# Patient Record
Sex: MALE | Race: WHITE | NOT HISPANIC OR LATINO | Employment: UNEMPLOYED | URBAN - METROPOLITAN AREA
[De-identification: names, ages, dates, MRNs, and addresses within clinical notes are randomized per-mention and may not be internally consistent; named-entity substitution may affect disease eponyms.]

---

## 2018-05-01 ENCOUNTER — HOSPITAL ENCOUNTER (EMERGENCY)
Facility: HOSPITAL | Age: 53
Discharge: HOME | End: 2018-05-01
Attending: EMERGENCY MEDICINE
Payer: MEDICAID

## 2018-05-01 VITALS
WEIGHT: 205 LBS | DIASTOLIC BLOOD PRESSURE: 68 MMHG | RESPIRATION RATE: 16 BRPM | HEART RATE: 76 BPM | BODY MASS INDEX: 28.7 KG/M2 | HEIGHT: 71 IN | OXYGEN SATURATION: 97 % | SYSTOLIC BLOOD PRESSURE: 120 MMHG | TEMPERATURE: 98 F

## 2018-05-01 DIAGNOSIS — M79.89 RIGHT LEG SWELLING: ICD-10-CM

## 2018-05-01 LAB
ALBUMIN SERPL-MCNC: 3.8 G/DL (ref 3.3–5.5)
ALP SERPL-CCNC: 67 U/L (ref 42–141)
BILIRUB SERPL-MCNC: 0.4 MG/DL (ref 0.2–1.6)
BILIRUBIN, POC UA: NEGATIVE
BLOOD, POC UA: ABNORMAL
BUN SERPL-MCNC: 14 MG/DL (ref 7–22)
CALCIUM SERPL-MCNC: 9.7 MG/DL (ref 8–10.3)
CHLORIDE SERPL-SCNC: 106 MMOL/L (ref 98–108)
CLARITY, POC UA: CLEAR
COLOR, POC UA: YELLOW
CREAT SERPL-MCNC: 1 MG/DL (ref 0.6–1.2)
GLUCOSE SERPL-MCNC: 155 MG/DL (ref 73–118)
GLUCOSE, POC UA: NEGATIVE
KETONES, POC UA: NEGATIVE
LEUKOCYTE EST, POC UA: NEGATIVE
NITRITE, POC UA: NEGATIVE
PH UR STRIP: 6 [PH]
POC ALT (SGPT): 31 U/L (ref 10–47)
POC AST (SGOT): 31 U/L (ref 11–38)
POC B-TYPE NATRIURETIC PEPTIDE: <5 PG/ML (ref 0–100)
POC TCO2: 27 MMOL/L (ref 18–33)
POTASSIUM BLD-SCNC: 3.9 MMOL/L (ref 3.6–5.1)
PROTEIN, POC UA: NEGATIVE
PROTEIN, POC: 6.8 G/DL (ref 6.4–8.1)
SODIUM BLD-SCNC: 147 MMOL/L (ref 128–145)
SPECIFIC GRAVITY, POC UA: 1.02
UROBILINOGEN, POC UA: 0.2 E.U./DL

## 2018-05-01 PROCEDURE — 80053 COMPREHEN METABOLIC PANEL: CPT

## 2018-05-01 PROCEDURE — 83880 ASSAY OF NATRIURETIC PEPTIDE: CPT

## 2018-05-01 PROCEDURE — 99284 EMERGENCY DEPT VISIT MOD MDM: CPT

## 2018-05-01 PROCEDURE — 81003 URINALYSIS AUTO W/O SCOPE: CPT

## 2018-05-01 PROCEDURE — 85025 COMPLETE CBC W/AUTO DIFF WBC: CPT

## 2018-05-01 RX ORDER — MUPIROCIN 20 MG/G
OINTMENT TOPICAL 3 TIMES DAILY
Qty: 22 G | Refills: 0 | Status: SHIPPED | OUTPATIENT
Start: 2018-05-01 | End: 2018-05-11

## 2018-05-01 RX ORDER — SULFAMETHOXAZOLE AND TRIMETHOPRIM 800; 160 MG/1; MG/1
1 TABLET ORAL 2 TIMES DAILY
Qty: 14 TABLET | Refills: 0 | Status: SHIPPED | OUTPATIENT
Start: 2018-05-01 | End: 2018-05-08
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
## 2019-07-01 ENCOUNTER — HOSPITAL ENCOUNTER (EMERGENCY)
Facility: HOSPITAL | Age: 54
Discharge: HOME | End: 2019-07-01
Attending: EMERGENCY MEDICINE
Payer: MEDICAID

## 2019-07-01 VITALS
WEIGHT: 230 LBS | OXYGEN SATURATION: 95 % | HEART RATE: 88 BPM | RESPIRATION RATE: 19 BRPM | BODY MASS INDEX: 32.2 KG/M2 | SYSTOLIC BLOOD PRESSURE: 167 MMHG | HEIGHT: 71 IN | TEMPERATURE: 98 F | DIASTOLIC BLOOD PRESSURE: 72 MMHG

## 2019-07-01 DIAGNOSIS — L03.90 CELLULITIS, UNSPECIFIED CELLULITIS SITE: Primary | ICD-10-CM

## 2019-07-01 LAB — POCT GLUCOSE: 144 MG/DL (ref 70–110)

## 2019-07-01 PROCEDURE — 99284 EMERGENCY DEPT VISIT MOD MDM: CPT | Mod: 25

## 2019-07-01 PROCEDURE — 96372 THER/PROPH/DIAG INJ SC/IM: CPT

## 2019-07-01 PROCEDURE — 63600175 PHARM REV CODE 636 W HCPCS: Performed by: EMERGENCY MEDICINE

## 2019-07-01 PROCEDURE — 82962 GLUCOSE BLOOD TEST: CPT

## 2019-07-01 PROCEDURE — 25000003 PHARM REV CODE 250: Performed by: EMERGENCY MEDICINE

## 2019-07-01 RX ORDER — KETOROLAC TROMETHAMINE 30 MG/ML
15 INJECTION, SOLUTION INTRAMUSCULAR; INTRAVENOUS
Status: COMPLETED | OUTPATIENT
Start: 2019-07-01 | End: 2019-07-01

## 2019-07-01 RX ORDER — CEPHALEXIN 500 MG/1
500 CAPSULE ORAL 4 TIMES DAILY
Qty: 20 CAPSULE | Refills: 0 | Status: SHIPPED | OUTPATIENT
Start: 2019-07-01 | End: 2019-07-06

## 2019-07-01 RX ORDER — CEPHALEXIN 250 MG/1
500 CAPSULE ORAL
Status: COMPLETED | OUTPATIENT
Start: 2019-07-01 | End: 2019-07-01

## 2019-07-01 RX ADMIN — CEPHALEXIN 500 MG: 250 CAPSULE ORAL at 09:07

## 2019-07-01 RX ADMIN — KETOROLAC TROMETHAMINE 15 MG: 30 INJECTION, SOLUTION INTRAMUSCULAR; INTRAVENOUS at 09:07

## 2019-07-02 NOTE — DISCHARGE INSTRUCTIONS
MOTRIN TYLENOL AS NEEDED FOR PAIN. ANTIBIOTICS AS DIRECTED.  PLEASE RETURN FOR ANY WORSENING OR CONCERNS.  PLEASE FOLLOW UP WITH PRIMARY CARE.

## 2019-07-02 NOTE — ED PROVIDER NOTES
"Encounter Date: 7/1/2019    SCRIBE #1 NOTE: I, Ebony HALEGumaro, am scribing for, and in the presence of,  Liliana Carty MD . I have scribed the following portions of the note - Other sections scribed: HPI, ROS, PE.       History     Chief Complaint   Patient presents with    Insect Bite     pt reports that he was sleeping in shed & was woken by a painful "bite" to his RIGHT medial ankle area/lower calf area 3 days ago; pt reports seeing a few spiders in the shed and thinks he might have been bitten by one; pt has redness, throbbing pain, & swelling that has worsened over the past 3 days; pt denies taking any medications for pain/swelling;    Leg Pain     CC: Insect Bite    53 year old male  has a past medical history of pre-diabetes, Back pain, Diverticulosis, Hypertension, and Prostate atrophy  presents to the ED for evaluation of erythema  that occurred 3nights ago to lower posterior calf after a possible insect bite to the area. Pt reports he slept in the his studio apartment (not a shed) 3 days ago. Pt is self-employed in construction and denies that he had a splinter at work. Pt hast not taken any home medications for pain. He did not inject anything to the area.   No ivdu  No fvr  No other co  Pt was recently on Clindamycin for a URI. Pt is allergic to iodine. No other symptoms.       The history is provided by the patient. No  was used.     Review of patient's allergies indicates:   Allergen Reactions    Iodine and iodide containing products Hives     Past Medical History:   Diagnosis Date    Back pain     Diverticulosis     Hypertension     Prostate atrophy      Past Surgical History:   Procedure Laterality Date    APPENDECTOMY      COLONOSCOPY N/A 2/5/2015    Performed by Mina Doshi MD at [facility]     Family History   Problem Relation Age of Onset    Diabetes Mother     Diabetes Father     Arthritis Father      Social History     Tobacco Use    Smoking status: " Current Some Day Smoker   Substance Use Topics    Alcohol use: No    Drug use: No     Review of Systems   Constitutional: Negative for appetite change and fever.   HENT: Negative for rhinorrhea and sore throat.    Eyes: Negative for visual disturbance.   Respiratory: Negative for cough and shortness of breath.    Cardiovascular: Negative for chest pain.   Gastrointestinal: Negative for abdominal pain.   Genitourinary: Negative for dysuria.   Musculoskeletal: Negative for gait problem.   Skin: Negative for rash.        Area of erythema and slight pain posterior left calf   Neurological: Negative for syncope.   All other systems reviewed and are negative.      Physical Exam     Initial Vitals [07/01/19 2111]   BP Pulse Resp Temp SpO2   139/75 96 19 99 °F (37.2 °C) 97 %      MAP       --         Physical Exam    Constitutional: He appears well-developed and well-nourished. He is not diaphoretic. No distress.   HENT:   Head: Normocephalic and atraumatic.   Eyes: Conjunctivae and EOM are normal. Pupils are equal, round, and reactive to light. No scleral icterus.   Neck: Normal range of motion. Neck supple.   Cardiovascular: Intact distal pulses.   Pulmonary/Chest: No stridor. No respiratory distress.   Musculoskeletal: Normal range of motion. He exhibits no edema or tenderness.   Neurological: He is alert and oriented to person, place, and time. He has normal strength. No cranial nerve deficit.   Skin: Skin is warm and dry. No rash noted.   None by 13 cm area of redness posterior lower calf not crossing over joint.  I have demarcated the area with a marker.  Not streaking.  No fluctuance.  No wound or drainable collection.  No evidence of spider or insect bite.   Psychiatric: He has a normal mood and affect. His behavior is normal.         ED Course   Procedures  Labs Reviewed - No data to display       Imaging Results    None                       Attending Attestation:             Attending ED Notes:   Patient with  slight area of cellulitis.  He has no MRSA risk factors.  Denies IV drug use.  Sugars 140.  He ate [employer] on the way here  No constitutional symptoms. No other complaints at this time.  He will watch the area.  He will look for streaking fever etc.  They understand when to return and when to follow-up as an outpatient.    No evidence of blistering or necrotizing fasciitis.  Denies IV drug use            is  Clinical Impression:  Cellulitis left lower extremity without abscess       ICD-10-CM ICD-9-CM   1. Cellulitis, unspecified cellulitis site L03.90 682.9                                Liliana Carty MD  07/01/19 2149       Liliana Carty MD  07/01/19 2150

## 2019-07-02 NOTE — ED TRIAGE NOTES
Pt arrived to the ED due localized right left pain, swelling, and redness that started a few days ago. Pt reports that he may have been bitten by an insect. Pt reports pain is 10/10

## 2020-01-04 ENCOUNTER — HOSPITAL ENCOUNTER (EMERGENCY)
Facility: HOSPITAL | Age: 55
Discharge: HOME | End: 2020-01-04
Attending: EMERGENCY MEDICINE
Payer: MEDICAID

## 2020-01-04 VITALS
TEMPERATURE: 98 F | SYSTOLIC BLOOD PRESSURE: 120 MMHG | OXYGEN SATURATION: 96 % | RESPIRATION RATE: 16 BRPM | HEART RATE: 87 BPM | HEIGHT: 70 IN | DIASTOLIC BLOOD PRESSURE: 80 MMHG | WEIGHT: 207 LBS | BODY MASS INDEX: 29.63 KG/M2

## 2020-01-04 DIAGNOSIS — S16.1XXA CERVICAL STRAIN, ACUTE, INITIAL ENCOUNTER: ICD-10-CM
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
## 2020-01-04 PROCEDURE — 63600175 PHARM REV CODE 636 W HCPCS: Mod: ER | Performed by: NURSE PRACTITIONER

## 2020-01-04 PROCEDURE — 96372 THER/PROPH/DIAG INJ SC/IM: CPT | Mod: ER

## 2020-01-04 PROCEDURE — 99284 EMERGENCY DEPT VISIT MOD MDM: CPT | Mod: 25,ER

## 2020-01-04 RX ORDER — DICLOFENAC SODIUM 50 MG/1
50 TABLET, DELAYED RELEASE ORAL EVERY 8 HOURS PRN
Qty: 20 TABLET | Refills: 0 | OUTPATIENT
Start: 2020-01-04 | End: 2022-09-02

## 2020-01-04 RX ORDER — KETOROLAC TROMETHAMINE 30 MG/ML
30 INJECTION, SOLUTION INTRAMUSCULAR; INTRAVENOUS
Status: COMPLETED | OUTPATIENT
Start: 2020-01-04 | End: 2020-01-04

## 2020-01-04 RX ORDER — METHOCARBAMOL 750 MG/1
1500 TABLET, FILM COATED ORAL EVERY 8 HOURS PRN
Qty: 30 TABLET | Refills: 0 | OUTPATIENT
Start: 2020-01-04 | End: 2022-09-02

## 2020-01-04 RX ADMIN — KETOROLAC TROMETHAMINE 30 MG: 30 INJECTION, SOLUTION INTRAMUSCULAR at 06:01
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
## 2021-03-23 ENCOUNTER — HOSPITAL ENCOUNTER (EMERGENCY)
Facility: HOSPITAL | Age: 56
Discharge: HOME | End: 2021-03-23
Attending: EMERGENCY MEDICINE
Payer: COMMERCIAL

## 2021-03-23 VITALS
HEIGHT: 70 IN | BODY MASS INDEX: 32.01 KG/M2 | SYSTOLIC BLOOD PRESSURE: 173 MMHG | RESPIRATION RATE: 17 BRPM | OXYGEN SATURATION: 96 % | TEMPERATURE: 98 F | WEIGHT: 223.56 LBS | HEART RATE: 63 BPM | DIASTOLIC BLOOD PRESSURE: 85 MMHG

## 2021-03-23 DIAGNOSIS — Z87.19 HISTORY OF DIVERTICULOSIS: ICD-10-CM

## 2021-03-23 DIAGNOSIS — K62.5 RECTAL BLEEDING: Primary | ICD-10-CM

## 2021-03-23 LAB
ALBUMIN SERPL BCP-MCNC: 4.2 G/DL (ref 3.5–5.2)
ALP SERPL-CCNC: 73 U/L (ref 55–135)
ALT SERPL W/O P-5'-P-CCNC: 45 U/L (ref 10–44)
ANION GAP SERPL CALC-SCNC: 12 MMOL/L (ref 8–16)
AST SERPL-CCNC: 26 U/L (ref 10–40)
BASOPHILS # BLD AUTO: 0.07 K/UL (ref 0–0.2)
BASOPHILS NFR BLD: 0.6 % (ref 0–1.9)
BILIRUB SERPL-MCNC: 0.3 MG/DL (ref 0.1–1)
BUN SERPL-MCNC: 15 MG/DL (ref 6–20)
CALCIUM SERPL-MCNC: 9.8 MG/DL (ref 8.7–10.5)
CHLORIDE SERPL-SCNC: 103 MMOL/L (ref 95–110)
CO2 SERPL-SCNC: 25 MMOL/L (ref 23–29)
CREAT SERPL-MCNC: 0.9 MG/DL (ref 0.5–1.4)
DIFFERENTIAL METHOD: ABNORMAL
EOSINOPHIL # BLD AUTO: 0.3 K/UL (ref 0–0.5)
EOSINOPHIL NFR BLD: 2.2 % (ref 0–8)
ERYTHROCYTE [DISTWIDTH] IN BLOOD BY AUTOMATED COUNT: 13.8 % (ref 11.5–14.5)
EST. GFR  (AFRICAN AMERICAN): >60 ML/MIN/1.73 M^2
EST. GFR  (NON AFRICAN AMERICAN): >60 ML/MIN/1.73 M^2
GLUCOSE SERPL-MCNC: 104 MG/DL (ref 70–110)
HCT VFR BLD AUTO: 47.9 % (ref 40–54)
HGB BLD-MCNC: 16.1 G/DL (ref 14–18)
IMM GRANULOCYTES # BLD AUTO: 0.05 K/UL (ref 0–0.04)
IMM GRANULOCYTES NFR BLD AUTO: 0.4 % (ref 0–0.5)
INR PPP: 1 (ref 0.8–1.2)
LYMPHOCYTES # BLD AUTO: 2.9 K/UL (ref 1–4.8)
LYMPHOCYTES NFR BLD: 24.1 % (ref 18–48)
MCH RBC QN AUTO: 31 PG (ref 27–31)
MCHC RBC AUTO-ENTMCNC: 33.6 G/DL (ref 32–36)
MCV RBC AUTO: 92 FL (ref 82–98)
MONOCYTES # BLD AUTO: 1 K/UL (ref 0.3–1)
MONOCYTES NFR BLD: 8 % (ref 4–15)
NEUTROPHILS # BLD AUTO: 7.8 K/UL (ref 1.8–7.7)
NEUTROPHILS NFR BLD: 64.7 % (ref 38–73)
NRBC BLD-RTO: 0 /100 WBC
OB PNL STL: POSITIVE
PLATELET # BLD AUTO: 283 K/UL (ref 150–350)
PMV BLD AUTO: 9.3 FL (ref 9.2–12.9)
POTASSIUM SERPL-SCNC: 4.5 MMOL/L (ref 3.5–5.1)
PROT SERPL-MCNC: 7.4 G/DL (ref 6–8.4)
PROTHROMBIN TIME: 10.7 SEC (ref 9–12.5)
RBC # BLD AUTO: 5.19 M/UL (ref 4.6–6.2)
SODIUM SERPL-SCNC: 140 MMOL/L (ref 136–145)
WBC # BLD AUTO: 12.05 K/UL (ref 3.9–12.7)

## 2021-03-23 PROCEDURE — 86803 HEPATITIS C AB TEST: CPT | Performed by: EMERGENCY MEDICINE

## 2021-03-23 PROCEDURE — 99283 EMERGENCY DEPT VISIT LOW MDM: CPT | Mod: ER

## 2021-03-23 PROCEDURE — 82272 OCCULT BLD FECES 1-3 TESTS: CPT | Mod: ER | Performed by: EMERGENCY MEDICINE

## 2021-03-23 PROCEDURE — 80053 COMPREHEN METABOLIC PANEL: CPT | Mod: ER | Performed by: EMERGENCY MEDICINE

## 2021-03-23 PROCEDURE — 85025 COMPLETE CBC W/AUTO DIFF WBC: CPT | Mod: ER | Performed by: EMERGENCY MEDICINE

## 2021-03-23 PROCEDURE — 85610 PROTHROMBIN TIME: CPT | Mod: ER | Performed by: EMERGENCY MEDICINE

## 2021-03-23 PROCEDURE — 86703 HIV-1/HIV-2 1 RESULT ANTBDY: CPT | Performed by: EMERGENCY MEDICINE

## 2021-03-24 LAB
HCV AB SERPL QL IA: POSITIVE
HIV 1+2 AB+HIV1 P24 AG SERPL QL IA: NEGATIVE

## 2022-09-02 ENCOUNTER — HOSPITAL ENCOUNTER (EMERGENCY)
Facility: HOSPITAL | Age: 57
Discharge: HOME | End: 2022-09-02
Attending: EMERGENCY MEDICINE
Payer: MEDICAID

## 2022-09-02 VITALS
HEART RATE: 67 BPM | TEMPERATURE: 98 F | HEIGHT: 69 IN | BODY MASS INDEX: 28.44 KG/M2 | SYSTOLIC BLOOD PRESSURE: 114 MMHG | WEIGHT: 192 LBS | RESPIRATION RATE: 18 BRPM | OXYGEN SATURATION: 98 % | DIASTOLIC BLOOD PRESSURE: 63 MMHG

## 2022-09-02 DIAGNOSIS — M25.551 RIGHT HIP PAIN: ICD-10-CM

## 2022-09-02 DIAGNOSIS — R52 PAIN: ICD-10-CM

## 2022-09-02 DIAGNOSIS — S76.011A HIP STRAIN, RIGHT, INITIAL ENCOUNTER: Primary | ICD-10-CM

## 2022-09-02 PROCEDURE — 63600175 PHARM REV CODE 636 W HCPCS: Mod: ER | Performed by: NURSE PRACTITIONER

## 2022-09-02 PROCEDURE — 96372 THER/PROPH/DIAG INJ SC/IM: CPT | Performed by: NURSE PRACTITIONER

## 2022-09-02 PROCEDURE — 99284 EMERGENCY DEPT VISIT MOD MDM: CPT | Mod: ER

## 2022-09-02 RX ORDER — IBUPROFEN 600 MG/1
600 TABLET ORAL EVERY 6 HOURS PRN
Qty: 20 TABLET | Refills: 0 | Status: SHIPPED | OUTPATIENT
Start: 2022-09-02 | End: 2022-09-07

## 2022-09-02 RX ORDER — KETOROLAC TROMETHAMINE 30 MG/ML
30 INJECTION, SOLUTION INTRAMUSCULAR; INTRAVENOUS
Status: COMPLETED | OUTPATIENT
Start: 2022-09-02 | End: 2022-09-02

## 2022-09-02 RX ORDER — GABAPENTIN 300 MG/1
300 CAPSULE ORAL 3 TIMES DAILY
COMMUNITY
Start: 2022-08-17

## 2022-09-02 RX ORDER — DEXTROMETHORPHAN HYDROBROMIDE, GUAIFENESIN 5; 100 MG/5ML; MG/5ML
650 LIQUID ORAL EVERY 8 HOURS
Qty: 20 TABLET | Refills: 0 | Status: SHIPPED | OUTPATIENT
Start: 2022-09-02 | End: 2022-09-07

## 2022-09-02 RX ORDER — DICLOFENAC SODIUM 10 MG/G
2 GEL TOPICAL 4 TIMES DAILY
Qty: 100 G | Refills: 0 | OUTPATIENT
Start: 2022-09-02 | End: 2023-02-06

## 2022-09-02 RX ORDER — METHOCARBAMOL 500 MG/1
1000 TABLET, FILM COATED ORAL 3 TIMES DAILY
Qty: 30 TABLET | Refills: 0 | Status: SHIPPED | OUTPATIENT
Start: 2022-09-02 | End: 2022-09-07

## 2022-09-02 RX ORDER — BUPRENORPHINE HYDROCHLORIDE, NALOXONE HYDROCHLORIDE 8; 2 MG/1; MG/1
FILM, SOLUBLE BUCCAL; SUBLINGUAL
COMMUNITY
Start: 2022-08-31

## 2022-09-02 RX ADMIN — KETOROLAC TROMETHAMINE 30 MG: 30 INJECTION, SOLUTION INTRAMUSCULAR at 12:09

## 2022-09-02 NOTE — ED PROVIDER NOTES
"Encounter Date: 9/2/2022    SCRIBE #1 NOTE: I, Isis Gutierrez, am scribing for, and in the presence of,  ROSAURA Mathew. I have scribed the following portions of the note - Other sections scribed: HPI; ROS; PE.     History     Chief Complaint   Patient presents with    Mass     Patient presents to ED c/o mass, pt reports right hip mass and pain, pt reports mass present for years, told it was a possible hernia, pain began 2 days ago 10/10. Denies otc pain medications for symptoms.      Jayy Garcia is a 57 y.o. male with Hx of HTN who presents to the ED for chief complaint of right hip pain onset 1 day ago.  He states that he was doing a lot of work yesterday and may have pulled something. Patient reports he has a knot in the right hip area that "burns like fire." He states he was doing a lot of activity 1 day ago. Patient is allergic to iodine. Patient denies testicular pain/swelling, rash, fever, chest pain, SOB, numbness, weakness, tingling, abdominal pain, back pain, dysuria, hematuria, testicular pain, penile discharge, nausea, vomiting, diarrhea, or any other complaints.  Patient rates the pain as 10/10 and has not taken any medications for the symptoms.  No alleviating factors. Movement is an aggravating factor. Patient uses tobacco and occasional EtOH, but does not use any recreational drugs.               The history is provided by the patient. No  was used.   Review of patient's allergies indicates:   Allergen Reactions    Iodine and iodide containing products Hives     Past Medical History:   Diagnosis Date    Back pain     Diverticulosis     Hepatitis C antibody positive in blood 03/24/2021    RNA NEGATIVE 2013    Hypertension     Prostate atrophy      Past Surgical History:   Procedure Laterality Date    APPENDECTOMY       Family History   Problem Relation Age of Onset    Diabetes Mother     Diabetes Father     Arthritis Father      Social History     Tobacco Use    " Smoking status: Some Days    Smokeless tobacco: Never   Substance Use Topics    Alcohol use: No    Drug use: No     Review of Systems   Constitutional:  Negative for chills and fever.   HENT:  Negative for congestion, ear pain, rhinorrhea and sore throat.    Eyes:  Negative for pain, discharge and redness.   Respiratory:  Negative for cough and shortness of breath.    Cardiovascular:  Negative for chest pain.   Gastrointestinal:  Negative for abdominal pain, diarrhea, nausea and vomiting.   Genitourinary:  Negative for dysuria, penile discharge, penile pain and testicular pain.   Musculoskeletal:  Positive for arthralgias (right hip). Negative for back pain, neck pain and neck stiffness.   Skin:  Negative for rash.   Neurological:  Negative for dizziness, weakness, light-headedness, numbness and headaches.   Psychiatric/Behavioral:  Negative for confusion.      Physical Exam     Initial Vitals [09/02/22 1119]   BP Pulse Resp Temp SpO2   (!) 142/72 68 17 98.2 °F (36.8 °C) 97 %      MAP       --         Physical Exam    Nursing note and vitals reviewed.  Constitutional: Vital signs are normal. He appears well-developed and well-nourished. He is cooperative.  Non-toxic appearance. He does not appear ill.   HENT:   Head: Normocephalic and atraumatic.   Right Ear: External ear normal.   Left Ear: External ear normal.   Eyes: Conjunctivae and EOM are normal. Pupils are equal, round, and reactive to light.   Neck: Neck supple.   Normal range of motion.  Cardiovascular:  Normal rate and regular rhythm.           Pulmonary/Chest: Effort normal and breath sounds normal.   Abdominal: Abdomen is soft. There is no abdominal tenderness.   Abdomen soft and non tender.  There is no rebound and no guarding.   Musculoskeletal:         General: Normal range of motion.      Cervical back: Normal range of motion and neck supple.      Right hip: Tenderness present. Normal range of motion.      Comments: Tenderness to right hip. No  erythema, bruising, or rash. Skin is intact. Decreased ROM due to pain; normal sensation and strength     Neurological: He is alert and oriented to person, place, and time. No cranial nerve deficit. GCS eye subscore is 4. GCS verbal subscore is 5. GCS motor subscore is 6.   Skin: Skin is warm, dry and intact. No rash noted.   Psychiatric: He has a normal mood and affect. His speech is normal and behavior is normal. Thought content normal.       ED Course   Procedures  Labs Reviewed - No data to display       Imaging Results              X-Ray Hip 2 or 3 views Right (with Pelvis when performed) (Final result)  Result time 09/02/22 12:16:09      Final result by Dayron López III, MD (09/02/22 12:16:09)                   Narrative:    EXAMINATION:  XR HIP WITH PELVIS WHEN PERFORMED, 2 OR 3  VIEWS RIGHT    CLINICAL HISTORY:  Pain, unspecified    FINDINGS:  Two views right hip: No fracture dislocation bone destruction seen.  There is minimal DJD.      Electronically signed by: Dayron López MD  Date:    09/02/2022  Time:    12:16                                     Medications   ketorolac injection 30 mg (30 mg Intramuscular Given 9/2/22 2946)     Medical Decision Making:   History:   Old Medical Records: I decided to obtain old medical records.  Independently Interpreted Test(s):   I have ordered and independently interpreted X-rays - see prior notes.  Clinical Tests:   Radiological Study: Ordered and Reviewed     APC / Resident Notes:   This is an evaluation of a 57 y.o. male that presents to the Emergency Department for right hip pain    Physical Exam shows a non-toxic, afebrile, and well appearing male. Tenderness to right hip. No erythema, bruising, or rash. Skin is intact. Decreased ROM due to pain; normal sensation and strength    Vital signs are reassuring. If available, previous records reviewed.   RESULTS: Xray right hip negative     My overall impression is Right hip strain. I considered, but at this time,  do not suspect fracture, dislocation, cellulitis, abscess.    ED Course: Xray, Toradol. Discharge Meds/Instructions: tylenol, Ibuprofen, Robaxin, Voltaren gel. The diagnosis, treatment plan, instructions for follow-up as well as ED return precautions were discussed. All questions have been answered.      Scribe Attestation:   Scribe #1: I performed the above scribed service and the documentation accurately describes the services I performed. I attest to the accuracy of the note.      ED Course as of 09/02/22 1239   Fri Sep 02, 2022   1233 X-Ray Hip 2 or 3 views Right (with Pelvis when performed) [AS]      ED Course User Index  [AS] Isis Matacido           Scribgui attestation: I, JESUS MANUEL Owen, personally performed the services described in this documentation.  All medical record entries made by the scribe were at my direction and in my presence.  I have reviewed the chart and agree that the record reflects my personal performance and is accurate and complete.    Clinical Impression:   Final diagnoses:  [R52] Pain  [S76.011A] Hip strain, right, initial encounter (Primary)  [M25.551] Right hip pain      ED Disposition Condition    Discharge Stable          ED Prescriptions       Medication Sig Dispense Start Date End Date Auth. Provider    acetaminophen (TYLENOL) 650 MG TbSR Take 1 tablet (650 mg total) by mouth every 8 (eight) hours. for 5 days 20 tablet 9/2/2022 9/7/2022 ROSAURA Stokes    ibuprofen (ADVIL,MOTRIN) 600 MG tablet Take 1 tablet (600 mg total) by mouth every 6 (six) hours as needed for Pain. 20 tablet 9/2/2022 9/7/2022 ROSAURA Stokes    methocarbamoL (ROBAXIN) 500 MG Tab Take 2 tablets (1,000 mg total) by mouth 3 (three) times daily. for 5 days 30 tablet 9/2/2022 9/7/2022 ROSAURA Stokes    diclofenac sodium (VOLTAREN) 1 % Gel Apply 2 g topically 4 (four) times daily. for 14 days 100 g 9/2/2022 9/16/2022 ROSAURA Stokes          Follow-up Information       Follow up With Specialties  Details Why Contact Info    Selene Garcia MD Orthopedic Surgery Schedule an appointment as soon as possible for a visit in 2 days  [address] [facility]  [place] [address]  [phone]      [facility] - Musculosketetal Neuromusculoskeletal Medicine Schedule an appointment as soon as possible for a visit in 2 days  [address] [facility] [address]  [phone]      [facility] ED Emergency Medicine Go to  If symptoms worsen [address] [facility] [address] [phone]             ROSAURA Stokes  09/02/22 1230       ROSAURA Stokes  09/02/22 1231

## 2022-09-02 NOTE — ED TRIAGE NOTES
Jayy Garcia, a 57 y.o. male presents to the ED via pv with CC of R hip pain that began yesterday after heavy lifting. Pt reports being diagnosed with a mass on the R hip years ago. Pt has a pmhx of RLS.

## 2023-02-06 ENCOUNTER — HOSPITAL ENCOUNTER (EMERGENCY)
Facility: HOSPITAL | Age: 58
Discharge: HOME | End: 2023-02-06
Attending: EMERGENCY MEDICINE
Payer: MEDICAID

## 2023-02-06 VITALS
SYSTOLIC BLOOD PRESSURE: 134 MMHG | WEIGHT: 192 LBS | HEIGHT: 68 IN | OXYGEN SATURATION: 95 % | RESPIRATION RATE: 20 BRPM | BODY MASS INDEX: 29.1 KG/M2 | HEART RATE: 75 BPM | TEMPERATURE: 98 F | DIASTOLIC BLOOD PRESSURE: 80 MMHG

## 2023-02-06 DIAGNOSIS — S63.637A SPRAIN OF INTERPHALANGEAL JOINT OF LEFT LITTLE FINGER, INITIAL ENCOUNTER: Primary | ICD-10-CM

## 2023-02-06 DIAGNOSIS — M79.645 FINGER PAIN, LEFT: ICD-10-CM

## 2023-02-06 PROCEDURE — 29130 APPL FINGER SPLINT STATIC: CPT | Mod: ER

## 2023-02-06 PROCEDURE — 99284 EMERGENCY DEPT VISIT MOD MDM: CPT | Mod: 25,ER

## 2023-02-06 RX ORDER — NAPROXEN 500 MG/1
500 TABLET ORAL 2 TIMES DAILY WITH MEALS
Qty: 20 TABLET | Refills: 0 | Status: SHIPPED | OUTPATIENT
Start: 2023-02-06 | End: 2023-02-16

## 2023-02-06 RX ORDER — DICLOFENAC SODIUM 10 MG/G
GEL TOPICAL
Qty: 100 G | Refills: 0 | Status: SHIPPED | OUTPATIENT
Start: 2023-02-06

## 2023-02-06 NOTE — ED PROVIDER NOTES
Encounter Date: 2/6/2023       History     Chief Complaint   Patient presents with    Hand Pain     Pt c/o pain in 5th digit of L hand while hitting a speed bag about an hour ago     57 y.o. male presents emergency department complaining of acute, left 5th digit pain localize to the D IP joint that began an hour prior to arrival.  Patient states he was punching speed that prior to the onset of pain.  He reports chronic arthritis in the same area but states pain is significantly worse after punching the speed bag.  He suspects finger may have bent backwards.  Right-hand dominant.  Plays guitar.  Denies weakness or numbness.    The history is provided by the patient.   Review of patient's allergies indicates:   Allergen Reactions    Iodine and iodide containing products Hives     Past Medical History:   Diagnosis Date    Back pain     Diverticulosis     Hepatitis C antibody positive in blood 03/24/2021    RNA NEGATIVE 2013    Prostate atrophy      Past Surgical History:   Procedure Laterality Date    APPENDECTOMY       Family History   Problem Relation Age of Onset    Diabetes Mother     Diabetes Father     Arthritis Father      Social History     Tobacco Use    Smoking status: Some Days    Smokeless tobacco: Never   Substance Use Topics    Alcohol use: No    Drug use: No     Review of Systems   Constitutional:  Negative for fever.   Musculoskeletal:  Positive for arthralgias and joint swelling.   Skin:  Negative for color change and wound.   Neurological:  Negative for weakness and numbness.   All other systems reviewed and are negative.    Physical Exam     Initial Vitals [02/06/23 0056]   BP Pulse Resp Temp SpO2   134/80 75 20 98.4 °F (36.9 °C) 95 %      MAP       --         Physical Exam    Nursing note and vitals reviewed.  Constitutional: He appears well-developed and well-nourished. He is not diaphoretic. No distress.   HENT:   Head: Normocephalic and atraumatic.   Mouth/Throat: Oropharynx is clear and moist.    Eyes: Conjunctivae are normal.   Neck: Phonation normal. No stridor present.   Normal range of motion.  Cardiovascular:  Regular rhythm and intact distal pulses.           Pulmonary/Chest: Effort normal. No accessory muscle usage or stridor. No tachypnea. No respiratory distress.   Abdominal: He exhibits no distension. There is no abdominal tenderness.   Musculoskeletal:         General: No tenderness. Normal range of motion.      Left hand: Swelling and bony tenderness present. No deformity or lacerations. Normal range of motion. Normal strength. Normal sensation. There is no disruption of two-point discrimination. Normal capillary refill. Normal pulse.        Hands:       Cervical back: Normal range of motion.     Neurological: He is alert and oriented to person, place, and time. He has normal strength. Gait normal. GCS eye subscore is 4. GCS verbal subscore is 5. GCS motor subscore is 6.   Skin: Skin is warm and intact. No abrasion, no bruising, no ecchymosis, no laceration and no abscess noted.   Psychiatric: He has a normal mood and affect.       ED Course   Splint Application    Date/Time: 2023 1:44 AM  Performed by: Amara Barrera MD  Authorized by: Amara Barrera MD   Consent Done: Yes  Consent: Verbal consent obtained.  Risks and benefits: risks, benefits and alternatives were discussed  Patient understanding: patient states understanding of the procedure being performed  Patient identity confirmed:  and verbally with patient  Location details: left small finger  Splint type: static finger  Supplies used: aluminum splint  Post-procedure: The splinted body part was neurovascularly unchanged following the procedure.  Patient tolerance: Patient tolerated the procedure well with no immediate complications      Labs Reviewed - No data to display       Imaging Results              X-Ray Finger 2 or More Views Left (Final result)  Result time 23 01:13:00      Final result by Pradeep Godinez MD  (23 01:13:00)                   Impression:      Degenerative changes and soft tissue edema.  No acute displaced fracture.      Electronically signed by: Pradeep Godinez MD  Date:    2023  Time:    01:13               Narrative:    EXAMINATION:  XR FINGER 2 OR MORE VIEWS LEFT    CLINICAL HISTORY:  injury to 5th digit of L hand;    TECHNIQUE:  Three views of the left 5th digit.    COMPARISON:  None.    FINDINGS:  No acute displaced fracture.  Moderate to advanced degenerative changes of the 5th distal interphalangeal joint.  Chronic appearing calcification adjacent to the proximal phalanx.  No dislocation.  No convincing radiopaque foreign body.  Mild soft tissue edema.                                       Medications - No data to display  Medical Decision Making:   Clinical Tests:   Radiological Study: Ordered and Reviewed  ED Management:  XR finger with soft tissue edema without fracture or dislocation.                         Clinical Impression:   Final diagnoses:  [M79.645] Finger pain, left  [S63.639B] Sprain of interphalangeal joint of left little finger, initial encounter (Primary)        ED Disposition Condition    Discharge Stable          ED Prescriptions       Medication Sig Dispense Start Date End Date Auth. Provider    naproxen (NAPROSYN) 500 MG tablet () Take 1 tablet (500 mg total) by mouth 2 (two) times daily with meals. for 10 days 20 tablet 2023 Amara Barrera MD    diclofenac sodium (VOLTAREN) 1 % Gel Apply 2g  to affected area every 6 hr as needed for pain. 100 g 2023 -- Amara Barrera MD          Follow-up Information       Follow up With Specialties Details Why Contact Info    Johnie Enriquez MD Family Medicine Call  As needed, for ongoing care [address]  [place] [address]  [phone]      The nearest emergency department.  Go to  As needed, If symptoms worsen     Selene Garcia MD Orthopedic Surgery Call  Monday morning, to schedule an  appointment, for re-evaluation of today's complaint [address]  [place] [address]  [phone]               Amara Barrera MD  02/28/23 3818

## 2023-02-06 NOTE — DISCHARGE INSTRUCTIONS
Keep splint in place until cleared by hand specialist.  May apply ice pack to the area for 15 minutes, 3 times daily as needed for pain and swelling.

## 2024-02-12 NOTE — ED NOTES
US at bedside  
Unable to perform Nutrition Focused Physical Assessment in current setting; RD will reassess as appropriate.

## 2024-06-30 ENCOUNTER — HOSPITAL ENCOUNTER (EMERGENCY)
Facility: HOSPITAL | Age: 59
Discharge: HOME | End: 2024-06-30
Attending: EMERGENCY MEDICINE

## 2024-06-30 VITALS
TEMPERATURE: 98 F | OXYGEN SATURATION: 98 % | HEIGHT: 70 IN | DIASTOLIC BLOOD PRESSURE: 87 MMHG | WEIGHT: 230 LBS | SYSTOLIC BLOOD PRESSURE: 149 MMHG | HEART RATE: 67 BPM | BODY MASS INDEX: 32.93 KG/M2 | RESPIRATION RATE: 16 BRPM

## 2024-06-30 DIAGNOSIS — M25.562 LEFT KNEE PAIN: Primary | ICD-10-CM

## 2024-06-30 PROCEDURE — 99283 EMERGENCY DEPT VISIT LOW MDM: CPT | Mod: 25,ER

## 2024-06-30 RX ORDER — ASPIRIN 325 MG
50000 TABLET, DELAYED RELEASE (ENTERIC COATED) ORAL
COMMUNITY
Start: 2024-05-23

## 2024-06-30 RX ORDER — INDOMETHACIN 50 MG/1
50 CAPSULE ORAL
Qty: 15 CAPSULE | Refills: 0 | Status: SHIPPED | OUTPATIENT
Start: 2024-06-30 | End: 2024-07-05

## 2024-06-30 RX ORDER — CHLORTHALIDONE 25 MG/1
1 TABLET ORAL DAILY
COMMUNITY
Start: 2024-05-23

## 2024-06-30 RX ORDER — METFORMIN HYDROCHLORIDE 500 MG/1
1 TABLET ORAL 2 TIMES DAILY
COMMUNITY
Start: 2024-05-23

## 2024-06-30 RX ORDER — SILDENAFIL 100 MG/1
100 TABLET, FILM COATED ORAL
COMMUNITY
Start: 2024-05-23

## 2024-06-30 NOTE — ED PROVIDER NOTES
Encounter Date: 6/30/2024    SCRIBE #1 NOTE: I, Elvi [place], am scribing for, and in the presence of,  Yolette Cain MD.       History     Chief Complaint   Patient presents with    Knee Pain     Left  knee pain x 1  week     57 yo M w/ PMHx of T2DM, fatty liver, tobacco abuse, presents to the ED w/ L knee pain x1 week. Reports pain with bearing weight and bending his L knee. Reports he woke up with the pain and mild swelling. Denies recent injury, fall or trauma, but states he regularly does heavy lifting at his job. Denies feeling instability or popping upon ambulation. Attempted Tx with aspirin, tylenol and biofreeze w/o relief. Has been wearing a knee brace which brings mild relief. No other exacerbating or alleviating factors. Denies fever, numbness, CP, SOB, wound, or other associated symptoms. Endorses compliance with metformin. Reports allergy to iodine.     The history is provided by the patient. No  was used.     Review of patient's allergies indicates:   Allergen Reactions    Iodine Hives    Iodine and iodide containing products Hives     Past Medical History:   Diagnosis Date    Back pain     Diverticulosis     Hepatitis C antibody positive in blood 03/24/2021    RNA NEGATIVE 2013    Prostate atrophy      Past Surgical History:   Procedure Laterality Date    APPENDECTOMY       Family History   Problem Relation Name Age of Onset    Diabetes Mother      Diabetes Father      Arthritis Father       Social History     Tobacco Use    Smoking status: Some Days    Smokeless tobacco: Never   Substance Use Topics    Alcohol use: No    Drug use: No     Review of Systems   Constitutional:  Negative for activity change, appetite change, chills and fever.   HENT:  Negative for congestion, rhinorrhea, sneezing and sore throat.    Respiratory:  Negative for cough, chest tightness, shortness of breath and wheezing.    Cardiovascular:  Negative for chest pain and palpitations.   Gastrointestinal:   Negative for abdominal pain, diarrhea, nausea and vomiting.   Musculoskeletal:  Positive for arthralgias (L knee) and joint swelling (L knee).   Skin:  Negative for rash and wound.   Neurological:  Negative for dizziness, light-headedness, numbness and headaches.   All other systems reviewed and are negative.      Physical Exam     Initial Vitals [06/30/24 0733]   BP Pulse Resp Temp SpO2   (!) 149/87 67 16 98.1 °F (36.7 °C) 98 %      MAP       --         Physical Exam    Nursing note and vitals reviewed.  Constitutional: He appears well-developed and well-nourished. No distress.   HENT:   Head: Normocephalic and atraumatic.   Eyes: Conjunctivae are normal.   Neck:   Normal range of motion.  Cardiovascular:  Normal rate and regular rhythm.           No murmur heard.  Pulmonary/Chest: Breath sounds normal. No respiratory distress.   Musculoskeletal:         General: No tenderness or edema. Normal range of motion.      Cervical back: Normal range of motion.      Left knee: No swelling, deformity, effusion, erythema or ecchymosis. Normal range of motion.      Comments: No swelling, erythema or deformity to left knee; normal ROM.      Neurological: He is alert and oriented to person, place, and time.   Skin: Skin is warm and dry. No rash noted.   Psychiatric: He has a normal mood and affect. His behavior is normal.         ED Course   Procedures  Labs Reviewed - No data to display       Imaging Results              X-Ray Knee 3 View Left (Final result)  Result time 06/30/24 08:38:09      Final result by Kam Martinez MD (06/30/24 08:38:09)                   Impression:      No acute findings.      Electronically signed by: Kam Martinez MD  Date:    06/30/2024  Time:    08:38               Narrative:    EXAMINATION:  XR KNEE 3 VIEW LEFT    CLINICAL HISTORY:  Pain in left knee    TECHNIQUE:  AP, lateral, and Merchant views of the left knee were performed.    COMPARISON:  None    FINDINGS:  The joint spaces are well  maintained.No fractures identified. The alignment is within normal limits.  No evidence of a marrow replacement process.                                       Medications - No data to display  Medical Decision Making  59 yo M w/ PMHx of T2DM, fatty liver, tobacco abuse, presents to the ED w/ L knee pain x1 week. Reports pain with bearing weight and bending his L knee. Reports he woke up with the pain and mild swelling. Denies recent injury, fall or trauma, but states he regularly does heavy lifting at his job. Denies feeling instability or popping upon ambulation. Attempted Tx with aspirin, tylenol and biofreeze w/o relief. Has been wearing a knee brace which brings mild relief. No other exacerbating or alleviating factors. Denies fever, numbness, CP, SOB, wound, or other associated symptoms. Endorses compliance with metformin. Reports allergy to iodine. On exam, there is no swelling, erythema or deformity to left knee; normal ROM. In shared decision making with patient, will order imaging. Xray with no bony abnormality. Can not exclude soft tissue injury. Recommend brace for comfort, ice, indocin for pain and follow up with Ortho.      Amount and/or Complexity of Data Reviewed  Radiology: ordered. Decision-making details documented in ED Course.    Risk  Prescription drug management.            Scribe Attestation:   Scribe #1: I performed the above scribed service and the documentation accurately describes the services I performed. I attest to the accuracy of the note.                           I, Dr. Yolette Cain, personally performed the services described in this documentation.   All medical record entries made by the scribe were at my direction and in my presence.   I have reviewed the chart and agree that the record is accurate and complete.   Yolette Cain MD.  8:14 AM 06/30/2024       Clinical Impression:  Final diagnoses:  [M25.562] Left knee pain (Primary)          ED Disposition Condition    Discharge Stable           ED Prescriptions       Medication Sig Dispense Start Date End Date Auth. Provider    indomethacin (INDOCIN) 50 MG capsule Take 1 capsule (50 mg total) by mouth 3 (three) times daily with meals. for 5 days 15 capsule 6/30/2024 7/5/2024 Yolette Cain MD          Follow-up Information       Follow up With Specialties Details Why Contact Info    [facility] Orthopedics Schedule an appointment as soon as possible for a visit   [address] [facility] [place] [address] [phone]             Yolette Cain MD  06/30/24 2070

## 2024-06-30 NOTE — DISCHARGE INSTRUCTIONS
Your knee xray is normal. You may still have a soft tissue injury not seen on xray. Take the prescribed pain medicine. Wear knee sleeve and ice when possible. Follow up with Orthopedics if you still have pain after competing the prescribed medication.

## 2024-06-30 NOTE — Clinical Note
"Jayy CAMARGOJAYRO Garcia was seen and treated in our emergency department on 6/30/2024.  He may return to work on 07/01/2024.       If you have any questions or concerns, please don't hesitate to call.      Yolette Cain MD"

## 2025-06-09 ENCOUNTER — HOSPITAL ENCOUNTER (EMERGENCY)
Facility: HOSPITAL | Age: 60
Discharge: HOME | End: 2025-06-09
Attending: INTERNAL MEDICINE

## 2025-06-09 VITALS
DIASTOLIC BLOOD PRESSURE: 80 MMHG | TEMPERATURE: 100 F | WEIGHT: 230 LBS | SYSTOLIC BLOOD PRESSURE: 154 MMHG | HEIGHT: 70 IN | BODY MASS INDEX: 32.93 KG/M2 | RESPIRATION RATE: 20 BRPM | HEART RATE: 84 BPM | OXYGEN SATURATION: 95 %

## 2025-06-09 DIAGNOSIS — H10.9 CONJUNCTIVITIS OF LEFT EYE, UNSPECIFIED CONJUNCTIVITIS TYPE: Primary | ICD-10-CM

## 2025-06-09 DIAGNOSIS — F17.210 CONTINUOUS DEPENDENCE ON CIGARETTE SMOKING: ICD-10-CM

## 2025-06-09 DIAGNOSIS — J06.9 VIRAL URI WITH COUGH: ICD-10-CM

## 2025-06-09 PROBLEM — F11.20 SEVERE OPIOID USE DISORDER: Status: ACTIVE | Noted: 2022-08-10

## 2025-06-09 PROCEDURE — 99284 EMERGENCY DEPT VISIT MOD MDM: CPT | Mod: ER

## 2025-06-09 RX ORDER — FLUTICASONE PROPIONATE 50 MCG
2 SPRAY, SUSPENSION (ML) NASAL DAILY
Qty: 15 G | Refills: 0 | Status: SHIPPED | OUTPATIENT
Start: 2025-06-09

## 2025-06-09 RX ORDER — ERYTHROMYCIN 5 MG/G
OINTMENT OPHTHALMIC EVERY 8 HOURS
Qty: 1 G | Refills: 0 | Status: SHIPPED | OUTPATIENT
Start: 2025-06-09 | End: 2025-06-14

## 2025-06-09 RX ORDER — FLUTICASONE PROPIONATE 50 MCG
2 SPRAY, SUSPENSION (ML) NASAL DAILY
Qty: 15 G | Refills: 0 | Status: SHIPPED | OUTPATIENT
Start: 2025-06-09 | End: 2025-06-09

## 2025-06-09 RX ORDER — AZELASTINE 1 MG/ML
2 SPRAY, METERED NASAL 2 TIMES DAILY
Qty: 30 ML | Refills: 0 | Status: SHIPPED | OUTPATIENT
Start: 2025-06-09 | End: 2025-06-09

## 2025-06-09 RX ORDER — IBUPROFEN 800 MG/1
800 TABLET, FILM COATED ORAL EVERY 8 HOURS PRN
Qty: 30 TABLET | Refills: 0 | Status: SHIPPED | OUTPATIENT
Start: 2025-06-09 | End: 2025-06-09

## 2025-06-09 RX ORDER — ERYTHROMYCIN 5 MG/G
OINTMENT OPHTHALMIC EVERY 8 HOURS
Qty: 1 G | Refills: 0 | Status: SHIPPED | OUTPATIENT
Start: 2025-06-09 | End: 2025-06-09

## 2025-06-09 RX ORDER — AZELASTINE 1 MG/ML
2 SPRAY, METERED NASAL 2 TIMES DAILY
Qty: 30 ML | Refills: 0 | Status: SHIPPED | OUTPATIENT
Start: 2025-06-09 | End: 2025-08-03

## 2025-06-09 RX ORDER — IBUPROFEN 800 MG/1
800 TABLET, FILM COATED ORAL EVERY 8 HOURS PRN
Qty: 30 TABLET | Refills: 0 | Status: SHIPPED | OUTPATIENT
Start: 2025-06-09

## 2025-06-09 NOTE — ED PROVIDER NOTES
Encounter Date: 6/9/2025       History     Chief Complaint   Patient presents with    Cough     Productive green cough x 1 month. Left redness and itching x3      59-year-old male presents to the emergency department complaining of nasal congestion, postnasal drip, runny nose and an episode of wheezing after smoking cigarettes yesterday.  Patient also states he is experiencing left eye redness with discharge for the past several hours.  Denies fever/chills/nausea/vomiting/chest pain/shortness of breath.    The history is provided by the patient. No  was used.     Review of patient's allergies indicates:   Allergen Reactions    Iodine Hives    Iodine and iodide containing products Hives     Past Medical History:   Diagnosis Date    Back pain     Diverticulosis     Hepatitis C antibody positive in blood 03/24/2021    RNA NEGATIVE 2013    Prostate atrophy      Past Surgical History:   Procedure Laterality Date    APPENDECTOMY       Family History   Problem Relation Name Age of Onset    Diabetes Mother      Diabetes Father      Arthritis Father       Social History[1]  Review of Systems   Constitutional:  Negative for chills and fever.   HENT:  Positive for congestion, postnasal drip and rhinorrhea.    Eyes:  Positive for discharge and redness. Negative for visual disturbance.   Respiratory:  Positive for cough and wheezing. Negative for chest tightness, shortness of breath and stridor.    Cardiovascular:  Negative for chest pain.   Gastrointestinal:  Negative for nausea and vomiting.   All other systems reviewed and are negative.      Physical Exam     Initial Vitals [06/09/25 0136]   BP Pulse Resp Temp SpO2   (!) 157/90 82 18 99.8 °F (37.7 °C) (!) 91 %      MAP       --         Physical Exam    Nursing note and vitals reviewed.  Constitutional: He is not diaphoretic. No distress.   HENT:   Head: Normocephalic and atraumatic.   Right Ear: External ear normal.   Left Ear: External ear normal.   Clear  nasal discharge, clear postnasal drip, posterior oropharyngeal erythema without exudate or edema, enlarged nasal turbinates   Eyes:   Left conjunctival injection with yellowish drainage and crusting   Neck:   Normal range of motion.  Cardiovascular:  Normal rate and regular rhythm.           Pulmonary/Chest: Breath sounds normal. No respiratory distress.   Abdominal: Abdomen is soft. Bowel sounds are normal.   Musculoskeletal:         General: Normal range of motion.      Cervical back: Normal range of motion.     Neurological: He is alert. He has normal strength.   Skin: Skin is warm and dry.   Psychiatric: He has a normal mood and affect. Thought content normal.         ED Course   Procedures  Labs Reviewed - No data to display       Imaging Results    None          Medications - No data to display  Medical Decision Making  59-year-old male presents to the emergency department complaining of nasal congestion, postnasal drip, runny nose and an episode of wheezing after smoking cigarettes yesterday.  Patient also states he is experiencing left eye redness with discharge for the past several hours.  Denies fever/chills/nausea/vomiting/chest pain/shortness of breath.  Course of ED stay:   Patient has been hemodynamically stable throughout ED stay and chest pain-free.  O2 sats having greater than 90% and patient has exhibited no signs or symptoms of respiratory distress throughout ED stay.  Pulmonary exam revealed full bilateral breath sounds and no evidence of wheezing.  Patient was counseled on cigarette smoking cessation, upper respiratory infection and conjunctivitis.  He was advised to follow up with his primary care physician within the next 3 days for re-evaluation/return to the emergency department if condition worsens.  Prescriptions for erythromycin ophthalmic ointment, ibuprofen, Astelin intranasal and fluticasone intranasal were given prior to discharge.    Risk  Prescription drug management.                                       Clinical Impression:  Final diagnoses:  [H10.9] Conjunctivitis of left eye, unspecified conjunctivitis type (Primary)  [J06.9] Viral URI with cough  [F17.210] Continuous dependence on cigarette smoking          ED Disposition Condition    Discharge Stable          ED Prescriptions       Medication Sig Dispense Start Date End Date Auth. Provider    erythromycin (ROMYCIN) ophthalmic ointment  (Status: Discontinued) Place into the left eye every 8 (eight) hours. Place a 1/2 inch ribbon of ointment into the lower eyelid. for 5 days 1 g 6/9/2025 6/9/2025 Carlos Stewart MD    azelastine (ASTELIN) 137 mcg (0.1 %) nasal spray  (Status: Discontinued) 2 sprays (274 mcg total) by Nasal route 2 (two) times daily. 30 mL 6/9/2025 6/9/2025 Carlos Stewart MD    fluticasone propionate (FLONASE) 50 mcg/actuation nasal spray  (Status: Discontinued) 2 sprays (100 mcg total) by Each Nostril route once daily. 15 g 6/9/2025 6/9/2025 Carlos Stewart MD    ibuprofen (ADVIL,MOTRIN) 800 MG tablet  (Status: Discontinued) Take 1 tablet (800 mg total) by mouth every 8 (eight) hours as needed for Pain. 30 tablet 6/9/2025 6/9/2025 Carlos Stewart MD    azelastine (ASTELIN) 137 mcg (0.1 %) nasal spray 2 sprays (274 mcg total) by Nasal route 2 (two) times daily. 30 mL 6/9/2025 8/3/2025 Carlos Stewart MD    erythromycin (ROMYCIN) ophthalmic ointment Place into the left eye every 8 (eight) hours. Place a 1/2 inch ribbon of ointment into the lower eyelid. for 5 days 1 g 6/9/2025 6/14/2025 Carlos Stewart MD    fluticasone propionate (FLONASE) 50 mcg/actuation nasal spray 2 sprays (100 mcg total) by Each Nostril route once daily. 15 g 6/9/2025 -- Carlos Stewart MD    ibuprofen (ADVIL,MOTRIN) 800 MG tablet Take 1 tablet (800 mg total) by mouth every 8 (eight) hours as needed for Pain. 30 tablet 6/9/2025 -- Carlos Stewart MD          Follow-up Information    None                [1]   Social  History  Tobacco Use    Smoking status: Some Days    Smokeless tobacco: Never   Substance Use Topics    Alcohol use: No    Drug use: No        Carlos Stewart MD  06/09/25 0226